# Patient Record
Sex: MALE
[De-identification: names, ages, dates, MRNs, and addresses within clinical notes are randomized per-mention and may not be internally consistent; named-entity substitution may affect disease eponyms.]

---

## 2021-10-06 ENCOUNTER — HOSPITAL ENCOUNTER (EMERGENCY)
Dept: HOSPITAL 60 - LB.ED | Age: 61
Discharge: HOME | End: 2021-10-06
Payer: MEDICAID

## 2021-10-06 VITALS — DIASTOLIC BLOOD PRESSURE: 88 MMHG | SYSTOLIC BLOOD PRESSURE: 165 MMHG | HEART RATE: 64 BPM

## 2021-10-06 DIAGNOSIS — Z79.899: ICD-10-CM

## 2021-10-06 DIAGNOSIS — M54.6: Primary | ICD-10-CM

## 2021-10-06 PROCEDURE — 71046 X-RAY EXAM CHEST 2 VIEWS: CPT

## 2021-10-06 PROCEDURE — 99283 EMERGENCY DEPT VISIT LOW MDM: CPT

## 2021-10-06 NOTE — CR
DATE OF SERVICE:  10/06/21

CLINICAL DATA:  back pain-focus posterior ribs



PA AND LATERAL CHEST:



No priors. 



The heart size is normal.  



The lungs are clear.  No pneumothorax.  No pleural effusions.  



There is degenerative disc disease throughout the thoracic spine.  There is 
anterior wedging of a couple of mid thoracic vertebra, age indeterminate. 



No other significant findings. 



[id number]

[facility]

## 2021-10-06 NOTE — EDM.PDOC
ED HPI GENERAL MEDICAL PROBLEM





- General


Chief Complaint: Back Pain or Injury


Stated Complaint: BROKEN RIB?


Time Seen by Provider: 10/06/21 09:35





- History of Present Illness


INITIAL COMMENTS - FREE TEXT/NARRATIVE: 





This patient presents to the emergency department as he broke his rib.  He 

states he fell backwards about a week ago in his home landing on the corner of a

coffee table.  He states since that time he has not had a lot of pain and there 

was no swelling or bruising.  This morning he states he rolled over in bed and 

had sudden and excruciating pain in the same area.  He denies any numbness or 

tingling.  He denies any weakness of his extremities.  He denies any 

incontinence of bowel or bladder.  He has had no recent illnesses or surgeries.





- Related Data


                                    Allergies











Allergy/AdvReac Type Severity Reaction Status Date / Time


 


No Known Allergies Allergy   Verified 10/06/21 09:43











Home Meds: 


                                    Home Meds





hydroCHLOROthiazide [Hydrochlorothiazide] 25 mg PO DAILY 09/21/15 [History]


lisinopriL [Lisinopril] 10 mg PO DAILY 10/06/21 [History]


valACYclovir HCl [Valacyclovir] 1,000 mg PO DAILY 10/06/21 [History]











Past Medical History





- Past Health History


Medical/Surgical History: Denies Medical/Surgical History


HEENT History: Reports: Other (See Below)


Other HEENT History: shingles in eye


Cardiovascular History: Reports: Hypertension


Genitourinary History: Reports: Renal Calculus


Musculoskeletal History: Reports: Other (See Below)


Other Musculoskeletal History: broken wrist





Social & Family History





- Alcohol Use


Days Per Week of Alcohol Use: 7


Number of Drinks Per Day: 3


Total Drinks Per Week: 21





- Recreational Drug Use


Recreational Drug Use: Yes


Recreational Drug Type: Reports: Marijuana/Hashish





ED ROS GENERAL





- Review of Systems


Review Of Systems: Comprehensive ROS is negative, except as noted in HPI.





ED EXAM,LOWER BACK PAIN/INJURY





- Physical Exam


Exam: See Below


Exam Limited By: No Limitations


General Appearance: Alert, WD/WN, No Apparent Distress


Eye Exam: Bilateral Eye: PERRL


Ears: Normal External Exam


Throat/Mouth: Normal Inspection


Head: Atraumatic, Normocephalic


Neck: Normal Inspection, Non-Tender, Full Range of Motion


Respiratory/Chest: No Respiratory Distress, No Accessory Muscle Use


Back Exam: Normal Inspection, Decreased Range of Motion (Related to pain), 

Muscle Spasm (Occasional), Other (There is no swelling, discoloration or 

deformity noted.)


Extremities: Normal Inspection


Neurological: Alert, Normal Mood/Affect





Course





- Vital Signs


Last Recorded V/S: 


                                Last Vital Signs











Temp  36.9 C   10/06/21 09:50


 


Pulse  64   10/06/21 09:50


 


Resp  18   10/06/21 09:50


 


BP  165/88 H  10/06/21 09:50


 


Pulse Ox  99   10/06/21 09:50














- Orders/Labs/Meds


Orders: 


                               Active Orders 24 hr











 Category Date Time Status


 


 Chest 2V [CR] Stat Exams  10/06/21 09:43 Taken











Meds: 


Medications














Discontinued Medications














Generic Name Dose Route Start Last Admin





  Trade Name Carmelo  PRN Reason Stop Dose Admin


 


Ibuprofen  800 mg  10/06/21 09:44  10/06/21 09:48





  Ibuprofen 800 Mg Tab  PO  10/06/21 09:45  800 mg





  ONETIME ONE   Administration


 


Ibuprofen  Confirm  10/06/21 09:58 





  Ibuprofen 800 Mg Tab  Administered  10/06/21 09:59 





  Dose  





  800 mg  





  .ROUTE  





  .STK-MED ONE  














- Re-Assessments/Exams


Free Text/Narrative Re-Assessment/Exam: 





10/06/21 10:32


This patient presents for evaluation of upper back pain.  Based on his history 

and exam this is most consistent with back pain of a musculoskeletal etiology.  

The pain is radicular in nature and there is nothing on exam to indicate it is a

 referred pain from the chest or of a cardiac or pulmonary etiology.  X-ray 

confirms there are no rib fractures.  There are no red flag symptoms to suggest 

there is an acute neurologic emergency, acute spinal cord or nerve root compre

ssion, or other acute nerve injury to indicate he needs an emergent MRI.  I do 

not suspect referred pain from intra-abdominal or urologic process.  Patient was

 provided with pain control and did state that ibuprofen was helping.  He was 

given prescriptions for Flexeril and Naprosyn.  He was instructed to follow-up 

with his primary care provider in about a week if he is not better, sooner if he

 is worse in any way.  The patient was stable at the time of discharge.





Departure





- Departure


Time of Disposition: 10:30


Disposition: Home, Self-Care 01


Condition: Good


Clinical Impression: 


 Back pain








- Discharge Information


*PRESCRIPTION DRUG MONITORING PROGRAM REVIEWED*: No


*COPY OF PRESCRIPTION DRUG MONITORING REPORT IN PATIENT WEI: No


Instructions:  Muscle Strain, Easy-to-Read


Referrals: 


Vargas Sinclair MD [Primary Care Provider] - 


Forms:  ED Department Discharge


Care Plan Goals: 


 Take Naprosyn 500mg twice daily and Flexeril 10mg every 6-8 hrs as needed.





Sepsis Event Note (ED)





- Evaluation


Sepsis Screening Result: No Definite Risk





- Focused Exam


Vital Signs: 


                                   Vital Signs











  Temp Pulse Resp BP Pulse Ox


 


 10/06/21 09:50  36.9 C  64  18  165/88 H  99














- My Orders


Last 24 Hours: 


My Active Orders





10/06/21 09:43


Chest 2V [CR] Stat 














- Assessment/Plan


Last 24 Hours: 


My Active Orders





10/06/21 09:43


Chest 2V [CR] Stat